# Patient Record
(demographics unavailable — no encounter records)

---

## 2024-08-29 DIAGNOSIS — Z84.89 FAMILY HISTORY OF GENETIC DISEASE: Primary | ICD-10-CM

## 2024-08-29 NOTE — PROGRESS NOTES
Order placed for genetic testing to follow-up on son's genetic test results.     Aurea Valles MS, [facility]  Licensed Genetic Counselor  [facility]  Phone: [phone]  Fax: [phone]

## 2024-09-10 NOTE — PROGRESS NOTES
9/10/24    Parental genetic test results reviewed via [employer] message in son's chart.     Aurea Valles MS, [facility]  Licensed Genetic Counselor  [facility] [place]  Phone: [phone]  Fax: [phone]